# Patient Record
Sex: MALE | Race: WHITE | NOT HISPANIC OR LATINO | Employment: OTHER | URBAN - METROPOLITAN AREA
[De-identification: names, ages, dates, MRNs, and addresses within clinical notes are randomized per-mention and may not be internally consistent; named-entity substitution may affect disease eponyms.]

---

## 2024-09-09 DIAGNOSIS — M25.562 LEFT KNEE PAIN, UNSPECIFIED CHRONICITY: Primary | ICD-10-CM

## 2024-09-11 NOTE — PROGRESS NOTES
History of Present Illness  No chief complaint on file.      The patient presents with side: left knee pain for 1 year.  The patient complains of worsening pain over the past 3 months.  Recently there has been concern for falls and instability.  There is increasing difficulty with activities of daily living and significant disability related to the knee pain.  The patient endorses the following non-operative treatments: Rest, ice, elevation.   There is increasing frustration with persistent pain and decreasing distance of ambulation.  The patient has difficulty with stairs as well as getting up from the floor.      No past medical history on file.    Medication Documentation Review Audit    **Prior to Admission medications have not yet been reviewed**         Not on File    Social History     Socioeconomic History    Marital status: Unknown     Spouse name: Not on file    Number of children: Not on file    Years of education: Not on file    Highest education level: Not on file   Occupational History    Not on file   Tobacco Use    Smoking status: Not on file    Smokeless tobacco: Not on file   Substance and Sexual Activity    Alcohol use: Not on file    Drug use: Not on file    Sexual activity: Not on file   Other Topics Concern    Not on file   Social History Narrative    Not on file     Social Determinants of Health     Financial Resource Strain: Not on file   Food Insecurity: Not on file   Transportation Needs: Not on file   Physical Activity: Not on file   Stress: Not on file   Social Connections: Not on file   Intimate Partner Violence: Not on file   Housing Stability: Not on file       Past Surgical History:   Procedure Laterality Date    CHOLECYSTECTOMY  01/27/2016    Cholecystectomy Laparoscopic    COLONOSCOPY  10/06/2015    Complete Colonoscopy    HERNIA REPAIR  10/31/2016    Inguinal Hernia Repair    UMBILICAL HERNIA REPAIR  10/31/2016    Umbilical Hernia Repair       BMI  29    Pain is described as  aching    Location: lateral, anterior  Pain level: 3  Assistive device: is not using any ambulatory assistive devices  History of surgery: None       Review of Systems   GENERAL: Negative for malaise, significant weight loss, fever  MUSCULOSKELETAL: see HPI  NEURO:  Negative     Exam  side: left Knee:  Skin healthy and intact  No gross swelling or ecchymosis  Alignment: normal    Correctable:  Not applicable      Effusion: mild      ROM:  0-135 degrees  Blank femoral  Crepitus with range of motion  No tenderness to palpation over medial and lateral joint line and with patellar compression      No laxity to valgus stress  No laxity to varus stress  Negative Lachman´s test  Negative posterior drawer test  negative Krish´s test  Logrolling of hip negative  No pain with internal rotation of the hip  Straight leg raise negative  Neurovascular exam normal distally  2+ DP pulse and good cap refill     Radiographs  Left knee films to my interpretation were negative for fracture, dislocation or destructive lesion.  There is some subchondral sclerosis and joint space narrowing consistent with early degenerative change         Assessment  Chondromalacia patella side: left knee      Plan  We discussed with the patient the diagnosis of degenerative joint disease of the knee.  We reviewed an evidence-based approach to osteoarthritis of the knee.  We strongly encouraged low-impact aerobic activity and non-opioid analgesics.     We discussed temporary pain relief with corticosteroid injections and the associated risks.  We also discussed the conflicting evidence regarding viscosupplementation and potential long-term risks with NSAID´s.  We reviewed the role of bracing for instability and physical therapy for atrophy and gait abnormalities.  We reviewed that the only curative process is for a joint arthroplasty.  The patient elected for Physical therapy and NSAIDS    I will see them in 1 month for recheck, sooner if there is any  problems.  Continuing to have pain consider corticosteroid injection.  Questions were answered.

## 2024-09-12 ENCOUNTER — APPOINTMENT (OUTPATIENT)
Dept: ORTHOPEDIC SURGERY | Facility: CLINIC | Age: 69
End: 2024-09-12
Payer: MEDICARE

## 2024-09-12 ENCOUNTER — HOSPITAL ENCOUNTER (OUTPATIENT)
Dept: RADIOLOGY | Facility: CLINIC | Age: 69
Discharge: HOME | End: 2024-09-12
Payer: MEDICARE

## 2024-09-12 VITALS — BODY MASS INDEX: 28.75 KG/M2 | HEIGHT: 74 IN | WEIGHT: 224 LBS

## 2024-09-12 DIAGNOSIS — M25.562 LEFT KNEE PAIN, UNSPECIFIED CHRONICITY: ICD-10-CM

## 2024-09-12 DIAGNOSIS — M17.12 PRIMARY OSTEOARTHRITIS OF LEFT KNEE: ICD-10-CM

## 2024-09-12 PROCEDURE — 99203 OFFICE O/P NEW LOW 30 MIN: CPT | Performed by: ORTHOPAEDIC SURGERY

## 2024-09-12 PROCEDURE — 1159F MED LIST DOCD IN RCRD: CPT | Performed by: ORTHOPAEDIC SURGERY

## 2024-09-12 PROCEDURE — 1036F TOBACCO NON-USER: CPT | Performed by: ORTHOPAEDIC SURGERY

## 2024-09-12 PROCEDURE — 3008F BODY MASS INDEX DOCD: CPT | Performed by: ORTHOPAEDIC SURGERY

## 2024-09-12 PROCEDURE — 73564 X-RAY EXAM KNEE 4 OR MORE: CPT | Mod: LT

## 2024-09-12 RX ORDER — MELOXICAM 15 MG/1
15 TABLET ORAL DAILY
Qty: 30 TABLET | Refills: 0 | Status: SHIPPED | OUTPATIENT
Start: 2024-09-12 | End: 2024-10-12

## 2024-09-12 ASSESSMENT — PATIENT HEALTH QUESTIONNAIRE - PHQ9
SUM OF ALL RESPONSES TO PHQ9 QUESTIONS 1 AND 2: 0
1. LITTLE INTEREST OR PLEASURE IN DOING THINGS: NOT AT ALL
2. FEELING DOWN, DEPRESSED OR HOPELESS: NOT AT ALL

## 2024-10-07 ENCOUNTER — EVALUATION (OUTPATIENT)
Dept: PHYSICAL THERAPY | Facility: CLINIC | Age: 69
End: 2024-10-07
Payer: MEDICARE

## 2024-10-07 VITALS — DIASTOLIC BLOOD PRESSURE: 57 MMHG | HEART RATE: 58 BPM | SYSTOLIC BLOOD PRESSURE: 100 MMHG

## 2024-10-07 DIAGNOSIS — M17.12 PRIMARY OSTEOARTHRITIS OF LEFT KNEE: ICD-10-CM

## 2024-10-07 PROCEDURE — 97161 PT EVAL LOW COMPLEX 20 MIN: CPT | Mod: GP

## 2024-10-07 PROCEDURE — 97110 THERAPEUTIC EXERCISES: CPT | Mod: GP

## 2024-10-07 NOTE — PROGRESS NOTES
Physical Therapy Evaluation and Treatment      Patient Name: Onesimo Bruno  MRN: [id number]  Today's Date: 10/7/2024  Visit #1  Time Calculation  Start Time: 1225  Stop Time: 1310  Time Calculation (min): 45 min    Insurance:  2024 30 COPAY, 0 DED, 3000 OOP MAX, VS MED NEC, MC 90 DAY 1/7/2025, NO AUTH     Assessment:  Mr. Malik is a 69 year old male referred to outpatient physical therapy for L knee OA that has been present for 6+ years, with intermittent symptoms.  At the time of evaluation, pt reported 0/10 pain in L knee and familiar symptoms not elicited at this time.  Examination findings are consistent with B Genu valgum, impaired B knee ROM, and altered L knee muscle performance. Patient will benefit from physical therapy services to improve listed impairments. Initiated treatment today to address these impairments.    Patient with the following impairments: decreased muscle performance, decreased ROM, and pain    Patient's response to session: No change in pain, Increased ROM/joint mobility, Increase motor control, and Increased knowledge and understanding    Plan:  Treatment/Interventions: Aquatic therapy, Education/ Instruction, Electrical stimulation, Gait training, Manual therapy, Mechanical traction, Neuromuscular re-education, Self care/ home management, Taping techniques, Therapeutic activities, Therapeutic exercises  PT Plan: Skilled PT  PT Frequency: 1 time per week  Duration: 10 Visits  Onset Date: 08/05/24  (LE dynamic LE strengthening).   1) Check tolerance to HEP.   2) Trial of leg press:   3) Nu step:      Current Problem:   1. Primary osteoarthritis of left knee  Referral to Physical Therapy          Subjective   Patient presenting today L knee OA.  My knee pain flares up inconsistently, every 3 months or 6 months.  Last time it flared up was August 2024, I was trying to snorkle with flipper, and it was painful going up and down the stairs after that.   I was offered knee injections, but I didn't  want it because my knee has been feeling good for about 6 weeks at that point. R knee has no pain or difficulty.  Overall the L knee pain has been on and off for about 6 years.   said that I need to strengthen up the knee joint and that should help.  Flare up lasts for about a week or so, going up and down stairs is painful, also walking is painful and my knee feels like it wants to buckle.  Pt denies falls in the last 6 months.      Pain is worse with walking up and down stairs, walking, . Pain is better with NA. Patient denies numbness/tingling, changes in bowel/bladder, saddle paresthesias, and falls.   Patient wishes to strengthen L knee.     PMH: Divirticulitis (2015), diabetes (Type II): controlled,     Past surgical:   Hernia repair 2018, gall bladder (2017).      Pain in the last week: 0/10        Objective   Imaging: x Ray L knee  1. Unremarkable radiographic evaluation of the left knee.   2. Mild medial compartment osteoarthrosis of the right knee     Knee Musculoskeletal Exam    Inspection    Right      Right knee inspection is normal.        Alignment: valgus      Left      Left knee inspection is normal.       Alignment: valgus      Palpation    Palpation additional comments: L tibial platue irritation (when flared).     Range of Motion    Right      Active extension: 0      Passive extension: 5      Active flexion: 115      Passive flexion: 115    Left      Active extension: 0      Passive extension: 5      Active flexion: 110      Passive flexion: 115    Strength    Right      Extension: 4+/5.       Flexion: 4/5.     Left      Extension: 4-/5.       Flexion: 4-/5.      Instability    Right      Anterior drawer: normal      Posterior drawer: normal    Left      Anterior drawer: normal      Posterior drawer: normal      Outcome Measures:  Other Measures  Lower Extremity Funtional Score (LEFS): 72     Treatments:  Therapeutic Exercise (58046):   minutes  Mini Squats  20  Standing HS curls  20 x (B),  with 1.5# resistance.   Side stepping with resistance  Red TB  8' x 6 reps.   LAQ (1.5#).   2 x 10 (B).   Heel slide (demo) in supine.     Manual Therapy (68733):   minutes      Neuromuscular Re-education (17849):   minutes      Education and discussion on HEP and treatment regarding the benefits related to current condition, POC, pathophysiology, and precautions  *added to HEP    Current HEP;   HOME EXERCISE PROGRAM [[id number]]    Mini squats -  Repeat 20 Repetitions, Complete 1 Set, Perform 2 Times a Day  Standing Hamstring Curl -  Repeat 10 Repetitions, Complete 1 Set, Perform 2 Times a Day  Side stepping with theraband -  Repeat 10 Repetitions, Complete 1 Set, Perform 2 Times a Day  LAQ -  Repeat 10 Repetitions, Complete 2 Sets, Perform 2 Times a Day  heel slides -  Repeat 10 Repetitions, Complete 3 Sets, Perform 2 Times a Day  heel slides  -  Repeat 10 Repetitions, Complete 2 Sets, Perform 2 Times a Day    Goals:  Patient will improve Lower Extremity Functional Scale score to meet minimal detectable change of improvement to improve performance of ADLs within 10 visits.     Patient will be independent with home exercise program for proper self-management of condition within 8 visits.     Patient will improve active range of knee AROM 0-125 for improved activity tolerance and ADL completion within 10 visits.     Patient will improve strength in L knee x>4/5 for improved ambulatory endurance and improved ADL completion within 10 visits.     Patient will report 0/10 pain in week period with regular LE strengthening and ADL completion within 10 visits.

## 2024-10-14 ENCOUNTER — APPOINTMENT (OUTPATIENT)
Dept: PHYSICAL THERAPY | Facility: CLINIC | Age: 69
End: 2024-10-14
Payer: MEDICARE

## 2024-10-21 ENCOUNTER — TREATMENT (OUTPATIENT)
Dept: PHYSICAL THERAPY | Facility: CLINIC | Age: 69
End: 2024-10-21
Payer: MEDICARE

## 2024-10-21 DIAGNOSIS — M17.12 PRIMARY OSTEOARTHRITIS OF LEFT KNEE: Primary | ICD-10-CM

## 2024-10-21 PROCEDURE — 97110 THERAPEUTIC EXERCISES: CPT | Mod: GP

## 2024-10-21 NOTE — PROGRESS NOTES
"Physical Therapy Treatment      Patient Name: Onesimo Bruno  MRN: [id number]  Today's Date: 10/21/2024  Visit #2 (1st follow up)  Time Calculation  Start Time: 1235  Stop Time: 1318  Time Calculation (min): 43 min    Insurance:  2024 30 COPAY, 0 DED, 3000 OOP MAX, VS MED NEC, MC 90 DAY 1/7/2025, NO AUTH     Assessment:  Reviewed HEP this session, in which the pt demoed good form and did not have increase in tissue irritation.  Added supine exercises including SLR, and bridging, pt demoed good form, and did not have abnormal symptoms.  Pt continues to deny tissue irritation with exercises.  Planning on trialing Cybex machines next session continuing to progress dynamic stabilization of the knee joint.  Pt would continue to benefit from skilled PT at this time.     Patient's response to session: No change in pain, Increased ROM/joint mobility, Increase motor control, and Increased knowledge and understanding    Plan:  Treatment/Interventions: Aquatic therapy, Education/ Instruction, Electrical stimulation, Gait training, Manual therapy, Mechanical traction, Neuromuscular re-education, Self care/ home management, Taping techniques, Therapeutic activities, Therapeutic exercises  PT Plan: Skilled PT  PT Frequency: 1 time per week  Duration: 10 Visits  Onset Date: 08/05/24  (LE dynamic LE strengthening).     1) Check tolerance to HEP.   2) Trial of Cybex next session   Leg EXT, HS curl, leg press.     (May place plan on hold).     Current Problem:   1. Primary osteoarthritis of left knee            Subjective   Pt reports, \"I have had no pain in the knee at all, I have been working on lifting the table at home, other than that I really haven't had any pain in the knees things have been going alright\"              Objective   Dr. Edwards: appointment, potential knee injection.     Treatments:  Therapeutic Exercise (14377): 38 minutes  Seated HS Stretch:   2 min x B  Cueing for proper form maintaining terminal knee EXT for proper " stretch.   SLR:   2 x 10 (B).  Bridging:  2 x 10   3s hold.   LAQ: (2#)  2 x 10 (B).   Deep Squats: (B UE support).   2 x 10   Nu step: (810 steps)  T: 10 minutes.   Seat 13.   Resistance: 3-4  Reviewed HEP  Adjusted, adding 2# ankle weights to SLR, Standing marches, side stepping.     Manual Therapy (54712):   minutes      Neuromuscular Re-education (02899):   minutes      Education and discussion on HEP and treatment regarding the benefits related to current condition, POC, pathophysiology, and precautions  *added to HEP    Current HEP;     HOME EXERCISE PROGRAM [[id number]]    Mini squats -  Repeat 20 Repetitions, Complete 1 Set, Perform 2 Times a Day  Standing Hamstring Curl -  Repeat 10 Repetitions, Complete 1 Set, Perform 2 Times a Day  Side stepping with theraband -  Repeat 10 Repetitions, Complete 1 Set, Perform 2 Times a Day  LAQ -  Repeat 10 Repetitions, Complete 2 Sets, Perform 2 Times a Day  heel slides -  Repeat 10 Repetitions, Complete 3 Sets, Perform 2 Times a Day  heel slides  -  Repeat 10 Repetitions, Complete 2 Sets, Perform 2 Times a Day  Hamstring Stretch  -  Repeat 13 Repetitions, Hold 10 Seconds, Complete 1 Set,  Bridge -  Repeat 10 Repetitions, Complete 2 Sets, Perform 1 Times a Day  Ankle weight hip Marches -  Repeat 15 Repetitions, Complete 2 Sets, Perform 3 Times a Week

## 2024-10-23 NOTE — PROGRESS NOTES
History of Present Illness  No chief complaint on file.      Patient with known osteoarthritis of the side: left knee who presents today for repeat evaluation.  The patient notes improving knee pain.  The patient notes improving mechanical symptoms.  The patient has tried the following modalities Physical therapy.      No past medical history on file.    Medication Documentation Review Audit       Reviewed by Kareen Bo MA (Medical Assistant) on 09/12/24 at 1351      Medication Order Taking? Sig Documenting Provider Last Dose Status            No Medications to Display                                   Allergies   Allergen Reactions    Sulfa (Sulfonamide Antibiotics) Swelling       Social History     Socioeconomic History    Marital status: Unknown     Spouse name: Not on file    Number of children: Not on file    Years of education: Not on file    Highest education level: Not on file   Occupational History    Not on file   Tobacco Use    Smoking status: Never    Smokeless tobacco: Never   Substance and Sexual Activity    Alcohol use: Yes     Comment: Occassionally    Drug use: Never    Sexual activity: Not on file   Other Topics Concern    Not on file   Social History Narrative    Not on file     Social Drivers of Health     Financial Resource Strain: Not on file   Food Insecurity: Not on file   Transportation Needs: Not on file   Physical Activity: Not on file   Stress: Not on file   Social Connections: Not on file   Intimate Partner Violence: Not on file   Housing Stability: Not on file       Past Surgical History:   Procedure Laterality Date    CHOLECYSTECTOMY  01/27/2016    Cholecystectomy Laparoscopic    COLONOSCOPY  10/06/2015    Complete Colonoscopy    HERNIA REPAIR  10/31/2016    Inguinal Hernia Repair    UMBILICAL HERNIA REPAIR  10/31/2016    Umbilical Hernia Repair            Review of Systems   GENERAL: Negative for malaise, significant weight loss, fever  MUSCULOSKELETAL: see HPI  NEURO:   Negative    BMI  28    Exam  side: left Knee:  Skin healthy and intact  No gross swelling or ecchymosis  Alignment: normal  Correctable:  not applicable  Effusion:  no  ROM: normal  Crepitance with range of motion  No pain with internal rotation of the hip  Tenderness to palpation: inferior pole of patella/patellar tendon Pain with patellar compression: No  No laxity to valgus stress  No laxity to varus stress  Negative Lachman´s test  Negative posterior drawer test  negative Krish´s test     Neurovascular exam normal distally  2+ DP pulse and good cap refill     Imaging  XR knee left 4+ views  Narrative: Interpreted By:  Noemy Carbajal,   STUDY:  Left knee, four views.      INDICATION:  Signs/Symptoms:pain.      COMPARISON:  None.      ACCESSION NUMBER(S):  [id number]      ORDERING CLINICIAN:  NATALIE VARELA      FINDINGS:  No acute fracture or malalignment.  Joint spaces are well maintained.  No significant knee joint effusion.  Soft tissues are unremarkable.      AP view radiograph of the right knee demonstrates mild medial  compartment joint space narrowing.      Impression: 1. Unremarkable radiographic evaluation of the left knee.  2. Mild medial compartment osteoarthrosis of the right knee      MACRO:  None.      Signed by: Noemy Carbajal 9/13/2024 7:59 PM  Dictation workstation:   [id number]         Assessment  Patient with known osteoarthritis of the side: left knee     Plan  We reviewed an evidence-based approach to OA of the knee.  We discussed past treatments as well options for future treatment.  He can use pain as his guide as far as his activities are concerned  Discontinue Mobic  Continue with PT  Follow up in the future if the knee pain persists, or worsens  All questions answered

## 2024-10-24 ENCOUNTER — APPOINTMENT (OUTPATIENT)
Dept: ORTHOPEDIC SURGERY | Facility: CLINIC | Age: 69
End: 2024-10-24
Payer: MEDICARE

## 2024-10-24 DIAGNOSIS — M17.12 PRIMARY OSTEOARTHRITIS OF LEFT KNEE: Primary | ICD-10-CM

## 2024-10-25 DIAGNOSIS — M17.12 PRIMARY OSTEOARTHRITIS OF LEFT KNEE: ICD-10-CM

## 2024-10-25 RX ORDER — MELOXICAM 15 MG/1
15 TABLET ORAL DAILY
Qty: 30 TABLET | Refills: 0 | Status: SHIPPED | OUTPATIENT
Start: 2024-10-25 | End: 2024-11-24

## 2024-10-28 ENCOUNTER — APPOINTMENT (OUTPATIENT)
Dept: PHYSICAL THERAPY | Facility: CLINIC | Age: 69
End: 2024-10-28
Payer: MEDICARE

## 2024-10-29 ENCOUNTER — TREATMENT (OUTPATIENT)
Dept: PHYSICAL THERAPY | Facility: CLINIC | Age: 69
End: 2024-10-29
Payer: MEDICARE

## 2024-10-29 DIAGNOSIS — M17.12 PRIMARY OSTEOARTHRITIS OF LEFT KNEE: ICD-10-CM

## 2024-10-29 PROCEDURE — 97110 THERAPEUTIC EXERCISES: CPT | Mod: GP

## 2025-03-06 LAB
NON-UH HIE A/G RATIO: 0.9
NON-UH HIE ALB: 4 G/DL (ref 3.4–5)
NON-UH HIE ALK PHOS: 63 UNIT/L (ref 45–117)
NON-UH HIE BILIRUBIN, TOTAL: 0.6 MG/DL (ref 0.3–1.2)
NON-UH HIE BUN/CREAT RATIO: 20
NON-UH HIE BUN: 26 MG/DL (ref 9–23)
NON-UH HIE CALCIUM: 10 MG/DL (ref 8.7–10.4)
NON-UH HIE CALCULATED OSMOLALITY: 285 MOSM/KG (ref 275–295)
NON-UH HIE CHLORIDE: 104 MMOL/L (ref 98–107)
NON-UH HIE CO2, VENOUS: 25 MMOL/L (ref 20–31)
NON-UH HIE CREATININE: 1.3 MG/DL (ref 0.6–1.1)
NON-UH HIE GFR AA: >60
NON-UH HIE GLOBULIN: 4.2 G/DL
NON-UH HIE GLOMERULAR FILTRATION RATE: 55 ML/MIN/1.73M?
NON-UH HIE GLUCOSE: 174 MG/DL (ref 74–106)
NON-UH HIE GOT: 43 UNIT/L (ref 15–37)
NON-UH HIE GPT: 52 UNIT/L (ref 10–49)
NON-UH HIE HGB A1C: 7.7 %
NON-UH HIE K: 4.6 MMOL/L (ref 3.5–5.1)
NON-UH HIE NA: 138 MMOL/L (ref 135–145)
NON-UH HIE TOTAL PROTEIN: 8.2 G/DL (ref 5.7–8.2)

## 2025-04-02 ENCOUNTER — APPOINTMENT (OUTPATIENT)
Facility: CLINIC | Age: 70
End: 2025-04-02
Payer: MEDICARE

## 2025-04-02 VITALS
HEIGHT: 74 IN | SYSTOLIC BLOOD PRESSURE: 120 MMHG | TEMPERATURE: 96.7 F | BODY MASS INDEX: 28.28 KG/M2 | DIASTOLIC BLOOD PRESSURE: 68 MMHG | WEIGHT: 220.4 LBS

## 2025-04-02 DIAGNOSIS — H90.3 ASYMMETRICAL SENSORINEURAL HEARING LOSS: Primary | ICD-10-CM

## 2025-04-02 DIAGNOSIS — H90.3 BILATERAL HIGH FREQUENCY SENSORINEURAL HEARING LOSS: ICD-10-CM

## 2025-04-02 DIAGNOSIS — R42 VERTIGO: ICD-10-CM

## 2025-04-02 PROCEDURE — 3008F BODY MASS INDEX DOCD: CPT | Performed by: OTOLARYNGOLOGY

## 2025-04-02 PROCEDURE — 1036F TOBACCO NON-USER: CPT | Performed by: OTOLARYNGOLOGY

## 2025-04-02 PROCEDURE — 1160F RVW MEDS BY RX/DR IN RCRD: CPT | Performed by: OTOLARYNGOLOGY

## 2025-04-02 PROCEDURE — 1159F MED LIST DOCD IN RCRD: CPT | Performed by: OTOLARYNGOLOGY

## 2025-04-02 PROCEDURE — 99204 OFFICE O/P NEW MOD 45 MIN: CPT | Performed by: OTOLARYNGOLOGY

## 2025-04-02 RX ORDER — EZETIMIBE 10 MG/1
1 TABLET ORAL
COMMUNITY
Start: 2025-01-19

## 2025-04-02 RX ORDER — ESCITALOPRAM OXALATE 10 MG/1
10 TABLET ORAL DAILY
COMMUNITY

## 2025-04-02 RX ORDER — TAMSULOSIN HYDROCHLORIDE 0.4 MG/1
CAPSULE ORAL
COMMUNITY

## 2025-04-02 RX ORDER — METFORMIN HYDROCHLORIDE 500 MG/1
1 TABLET ORAL 2 TIMES DAILY
COMMUNITY
Start: 2023-04-19

## 2025-04-02 RX ORDER — INSULIN LISPRO 100 [IU]/ML
INJECTION, SOLUTION INTRAVENOUS; SUBCUTANEOUS
COMMUNITY

## 2025-04-02 RX ORDER — TIRZEPATIDE 2.5 MG/.5ML
INJECTION, SOLUTION SUBCUTANEOUS
COMMUNITY
Start: 2025-03-31

## 2025-04-02 NOTE — PROGRESS NOTES
Impression:  1. Asymmetrical sensorineural hearing loss  MR IAC w and wo IV contrast      2. Bilateral high frequency sensorineural hearing loss        3. Vertigo  MR IAC w and wo IV contrast           RECOMMENDATIONS/PLAN :  I reassured the patient that medically his ears look excellent and his tympanic membrane's are moving well today.  Due to the asymmetric sensorineural hearing loss in that left ear and his previous unsteadiness and vertigo, we will set him up with an MRI of his IACs with attention to the left ear to make sure he does not have a retrocochlear mass/vestibular schwannoma.  I will call him with those results and make further recommendations.  In the interim he will look into hearing aids as well.      **This electronic medical record note was created with the use of voice recognition software.  Despite proofreading, typographical or grammatical errors may be present that could affect meaning of content **    Subjective   Patient ID:     Onesimo Bruno is a 69 y.o. male who presents to the office today with a history of hearing loss and it seems like his left ear is worse than the right.  He denies any previous loud noise exposure.  In the past he has had lightheadedness with vertigo.  He denies any other neurologic complaints.  No specific fullness or pressure in that left ear.  No significant ringing in the ears.    ROS:  A detailed 12 system review of systems is noted on the intake form has been reviewed with the patient with details noted in the HPI and scanned into the patient's medical record.    Objective     History reviewed. No pertinent past medical history.     Past Surgical History:   Procedure Laterality Date    CHOLECYSTECTOMY  01/27/2016    Cholecystectomy Laparoscopic    COLONOSCOPY  10/06/2015    Complete Colonoscopy    HERNIA REPAIR  10/31/2016    Inguinal Hernia Repair    UMBILICAL HERNIA REPAIR  10/31/2016    Umbilical Hernia Repair        Allergies   Allergen Reactions    Sulfa  "(Sulfonamide Antibiotics) Swelling          Current Outpatient Medications:     empagliflozin (Jardiance) 10 mg tablet, Take 1 tablet (10 mg) by mouth once daily in the evening. Take with meals., Disp: , Rfl:     escitalopram (Lexapro) 10 mg tablet, Take 1 tablet (10 mg) by mouth once daily., Disp: , Rfl:     ezetimibe (Zetia) 10 mg tablet, Take 1 tablet (10 mg) by mouth early in the morning.., Disp: , Rfl:     insulin lispro (HumaLOG) 100 unit/mL pen, USE AS DIRECTED TWICE DAILY before meals (BREAKFAST and dinner) per sliding scale. max 24 units/day, Disp: , Rfl:     metFORMIN (Glucophage) 500 mg tablet, Take 1 tablet (500 mg) by mouth 2 times a day., Disp: , Rfl:     Mounjaro 2.5 mg/0.5 mL pen injector, , Disp: , Rfl:     tamsulosin (Flomax) 0.4 mg 24 hr capsule, , Disp: , Rfl:      Tobacco Use: Low Risk  (4/2/2025)    Patient History     Smoking Tobacco Use: Never     Smokeless Tobacco Use: Never     Passive Exposure: Not on file        Alcohol Use: Not on file        Social History     Substance and Sexual Activity   Drug Use Never        Physical Exam:  Visit Vitals  /68   Temp 35.9 °C (96.7 °F) (Temporal)   Ht 1.88 m (6' 2\")   Wt 100 kg (220 lb 6.4 oz)   BMI 28.30 kg/m²   Smoking Status Never   BSA 2.29 m²      General: Patient is alert, oriented, cooperative in no apparent distress.  Head: Normocephalic, atraumatic.  Eyes: PERRL, EOMI, Conjunctiva is clear. No nystagmus.  Ears: Right Ear-- Pinna is normal.  External auditory canal is patent. Tympanic membrane is [intact, translucent and has good mobility with my pneumatic otoscope. No effusion].  Mastoid is nontender.  Left ear-- Pinna is normal.  External auditory canal is patent. Tympanic membrane is [intact, translucent and has good mobility with my pneumatic otoscope.  No effusion].  Mastoid is nontender.  Nose: Septum is straight.  No septal perforation or lesions. No septal hematoma/ seroma.  No signs of bleeding.  Inferior turbinates are normal.  "  No evidence of intranasal polyps.  No infectious drainage.  Throat:  Floor of mouth is clear, no masses.  Tongue appears normal, no lesions or masses. Gums, gingiva, buccal mucosa appear pink and moist, no lesions. Teeth are in good repair.  No obvious dental infections.  Peritonsillar regions appear symmetric without swelling.  Hard and soft palate appear normal, no obvious cleft. Uvula is midline.  Oropharynx: No lesions. Retropharyngeal wall is flat.  No active postnasal drip.  Neck: Supple,  no lymphadenopathy.  No masses.  Salivary Glands: Symmetric bilaterally.  No palpable masses.  No evidence of acute infection or salivary stones  Neurologic: Cranial Nerves 2-12 are grossly intact without focal deficits. Cerebellar function testing is normal.     Results:   I reviewed his previous audiogram from [facility] [place] and this revealed a bilateral high-frequency sensorineural loss with an asymmetric high-frequency loss in that left ear.  His speech reception threshold is 15 dB in the right ear and 25 dB in the left ear.  Word recognition scores were 100% bilaterally.    Procedure:   []    Pedro Perez, DO

## 2025-04-15 ENCOUNTER — HOSPITAL ENCOUNTER (OUTPATIENT)
Dept: RADIOLOGY | Facility: CLINIC | Age: 70
Discharge: HOME | End: 2025-04-15
Payer: MEDICARE

## 2025-04-15 DIAGNOSIS — H90.3 ASYMMETRICAL SENSORINEURAL HEARING LOSS: ICD-10-CM

## 2025-04-15 DIAGNOSIS — R42 VERTIGO: ICD-10-CM

## 2025-04-15 PROCEDURE — 2550000001 HC RX 255 CONTRASTS: Performed by: OTOLARYNGOLOGY

## 2025-04-15 PROCEDURE — 70553 MRI BRAIN STEM W/O & W/DYE: CPT

## 2025-04-15 PROCEDURE — A9575 INJ GADOTERATE MEGLUMI 0.1ML: HCPCS | Performed by: OTOLARYNGOLOGY

## 2025-04-15 RX ORDER — GADOTERATE MEGLUMINE 376.9 MG/ML
0.2 INJECTION INTRAVENOUS
Status: COMPLETED | OUTPATIENT
Start: 2025-04-15 | End: 2025-04-15

## 2025-04-15 RX ADMIN — GADOTERATE MEGLUMINE 20 ML: 376.9 INJECTION INTRAVENOUS at 14:08

## 2025-04-18 ENCOUNTER — TELEPHONE (OUTPATIENT)
Facility: CLINIC | Age: 70
End: 2025-04-18
Payer: MEDICARE

## 2025-04-18 NOTE — TELEPHONE ENCOUNTER
I left a voicemail for Onesimo regarding his MRI of his IACs.  There was no evidence of any retrocochlear mass or any intracranial abnormalities.  I advised the patient to call the office with any concerns or problems and we will repeat an audiogram over the next 6 to 9 months.  I will await his return call.

## 2025-08-21 LAB
NON-UH HIE A/G RATIO: 1
NON-UH HIE ALB: 4.1 G/DL (ref 3.4–5)
NON-UH HIE ALK PHOS: 66 UNIT/L (ref 45–117)
NON-UH HIE BILIRUBIN, TOTAL: 0.8 MG/DL (ref 0.3–1.2)
NON-UH HIE BUN/CREAT RATIO: 14.5
NON-UH HIE BUN: 16 MG/DL (ref 9–23)
NON-UH HIE CALCIUM: 9.3 MG/DL (ref 8.7–10.4)
NON-UH HIE CALCULATED OSMOLALITY: 282 MOSM/KG (ref 275–295)
NON-UH HIE CHLORIDE: 105 MMOL/L (ref 98–107)
NON-UH HIE CO2, VENOUS: 24 MMOL/L (ref 20–31)
NON-UH HIE CREATININE: 1.1 MG/DL (ref 0.6–1.1)
NON-UH HIE GFR AA: >60
NON-UH HIE GLOBULIN: 4.2 G/DL
NON-UH HIE GLOMERULAR FILTRATION RATE: >60 ML/MIN/1.73M?
NON-UH HIE GLUCOSE: 164 MG/DL (ref 74–106)
NON-UH HIE GOT: 48 UNIT/L (ref 15–37)
NON-UH HIE GPT: 52 UNIT/L (ref 10–49)
NON-UH HIE HGB A1C: 6.7 %
NON-UH HIE K: 4.1 MMOL/L (ref 3.5–5.1)
NON-UH HIE NA: 139 MMOL/L (ref 135–145)
NON-UH HIE TOTAL PROTEIN: 8.3 G/DL (ref 5.7–8.2)
NON-UH HIE VIT D 25: 45 NG/ML